# Patient Record
Sex: FEMALE
[De-identification: names, ages, dates, MRNs, and addresses within clinical notes are randomized per-mention and may not be internally consistent; named-entity substitution may affect disease eponyms.]

---

## 2019-02-13 PROBLEM — Z00.00 ENCOUNTER FOR PREVENTIVE HEALTH EXAMINATION: Status: ACTIVE | Noted: 2019-02-13

## 2019-02-20 ENCOUNTER — APPOINTMENT (OUTPATIENT)
Dept: OTOLARYNGOLOGY | Facility: CLINIC | Age: 44
End: 2019-02-20

## 2024-09-03 ENCOUNTER — NON-APPOINTMENT (OUTPATIENT)
Age: 49
End: 2024-09-03

## 2024-09-03 ENCOUNTER — APPOINTMENT (OUTPATIENT)
Dept: RADIATION ONCOLOGY | Facility: CLINIC | Age: 49
End: 2024-09-03
Payer: COMMERCIAL

## 2024-09-03 ENCOUNTER — APPOINTMENT (OUTPATIENT)
Dept: NEUROSURGERY | Facility: CLINIC | Age: 49
End: 2024-09-03
Payer: COMMERCIAL

## 2024-09-03 VITALS
RESPIRATION RATE: 18 BRPM | HEIGHT: 60 IN | HEART RATE: 67 BPM | OXYGEN SATURATION: 98 % | SYSTOLIC BLOOD PRESSURE: 132 MMHG | DIASTOLIC BLOOD PRESSURE: 85 MMHG | BODY MASS INDEX: 18.85 KG/M2 | WEIGHT: 96 LBS

## 2024-09-03 DIAGNOSIS — Z87.891 PERSONAL HISTORY OF NICOTINE DEPENDENCE: ICD-10-CM

## 2024-09-03 DIAGNOSIS — Z85.3 PERSONAL HISTORY OF MALIGNANT NEOPLASM OF BREAST: ICD-10-CM

## 2024-09-03 DIAGNOSIS — G93.89 OTHER SPECIFIED DISORDERS OF BRAIN: ICD-10-CM

## 2024-09-03 PROCEDURE — 99204 OFFICE O/P NEW MOD 45 MIN: CPT | Mod: 95

## 2024-09-03 PROCEDURE — 99204 OFFICE O/P NEW MOD 45 MIN: CPT

## 2024-09-03 RX ORDER — ACETAMINOPHEN 500 MG/1
TABLET ORAL
Refills: 0 | Status: ACTIVE | COMMUNITY

## 2024-09-03 RX ORDER — ZINC SULFATE 50(220)MG
CAPSULE ORAL
Refills: 0 | Status: ACTIVE | COMMUNITY

## 2024-09-03 RX ORDER — B-COMPLEX WITH VITAMIN C
TABLET ORAL
Refills: 0 | Status: ACTIVE | COMMUNITY

## 2024-09-03 RX ORDER — ERGOCALCIFEROL 1.25 MG/1
CAPSULE ORAL
Refills: 0 | Status: ACTIVE | COMMUNITY

## 2024-09-03 RX ORDER — TAMOXIFEN CITRATE 20 MG/1
TABLET, FILM COATED ORAL
Refills: 0 | Status: ACTIVE | COMMUNITY

## 2024-09-03 RX ORDER — OMEGA-3/DHA/EPA/FISH OIL 300-1000MG
CAPSULE ORAL
Refills: 0 | Status: ACTIVE | COMMUNITY

## 2024-09-03 NOTE — HISTORY OF PRESENT ILLNESS
[FreeTextEntry1] : Haley Rodriguez is a 50 y/o right- handed female, former smoker, with a PMHx of inflammatory arthritis, GERD, iron deficiency anemia, dx with infiltrating ductal carcinoma of right breast Oct 2022 s/p biopsy 10/7/22- PATH: invasive and in situ ductal carcinoma (ER positive, [place] positive, HER 2 3+ positive, Ki67 50%); s/p chemo Nov 2022- April 2023; s/p bilateral mastectomies 6/1/23 with bilateral breast reconstruction 9/5/23 who presents for evaluation of brain mass on recent MRI seen during workup for headache x 3 days that resolved on its own. She has had all her oncological care with [facility] and saw Dr. DiAmico today for second opinion. She is being seen for consideration of radiation therapy.  She plans to present to ED at [facility] today for expedited work up.   No radiation history No pacemaker  LMP 12/2022, states she is not pregnant.   Onc HX: - 9/6/22 presented to ER with right breast pass that was painful/growing - 9/22/22 diagnostic mammo and US of right breast concerning for mass.  - 10/7/22 s/p right breast core needle biopsy. PATH: invasive and in situ ductal carcinoma; (ER positive, [place] positive, HER 2 3+ positive, Ki67 50%) - 11/22/22- 1/24/23 completed neoadjuvant chemotherapy: first of Adriamycin 60 mg/m2 with cytoxan 600 mg/m2 (4 cycles) - 2/14/23- 4/18/23 s/p taxotere 75 mg/m2 with herceptin and perjeta - 6/1/23 s/p bilateral mastectomies - 7/12/23 restarted herceptin and perjeta with anastozole and lupron  - 9/5/23 s/p breast reconstruction  - 11/1/23: due to severe musculoskeletal pain and osteoporosis, anastazole was discontinued and tamoxifen started.  - 3/18/24 completed 1 year of herceptin and perjeta cycle #17  - She had headache x 3 days in about June that resolved on its own. Her PCP ordered her head CTH for eval that she completed 8/23/24 which was c/f interval development of cystic mass in left cerebellum compared to CT head 2/20/23. Was then referred for MRI brain which she completed 8/29/24 with report of a cystic mass in the left posterior fossa measuring 1.8 x 3 cm with associated rim enhancement and mild T2 hyperintense signal in the left cerebral hemisphere.   Remains on tamoxifen per onc Dr. Ureña @ [facility].   CARE TEAM:  *Onc: Jairo Ureña @ [facility]  [phone] [address] [place] [address]   *PCP: Marylou Carroll @ [facility]  [phone] [address] [place] [address]

## 2024-09-03 NOTE — PHYSICAL EXAM
[General Appearance - Alert] : alert [General Appearance - In No Acute Distress] : in no acute distress [General Appearance - Well-Appearing] : healthy appearing [Oriented To Time, Place, And Person] : oriented to person, place, and time [Impaired Insight] : insight and judgment were intact [Affect] : the affect was normal [Memory Recent] : recent memory was not impaired [Motor Tone] : muscle tone was normal in all four extremities [Motor Strength] : muscle strength was normal in all four extremities [Motor Handedness Right-Handed] : the patient is right hand dominant [Sensation Tactile Decrease] : light touch was intact [Sclera] : the sclera and conjunctiva were normal [Neck Appearance] : the appearance of the neck was normal [] : no respiratory distress [Respiration, Rhythm And Depth] : normal respiratory rhythm and effort [Abnormal Walk] : normal gait [Skin Color & Pigmentation] : normal skin color and pigmentation [FreeTextEntry5] : CN II-XII grossly intact

## 2024-09-03 NOTE — REASON FOR VISIT
[Consideration of Curative Therapy] : consideration of curative therapy for [Brain Metastasis] : brain metastasis [Home] : at home, [unfilled] , at the time of the visit. [Medical Office: ([facility])___] : at the medical office located in  [Patient] : the patient

## 2024-09-03 NOTE — REASON FOR VISIT
[Consideration of Curative Therapy] : consideration of curative therapy for [Brain Metastasis] : brain metastasis [Home] : at home, [unfilled] , at the time of the visit. [Medical Office: ([facility])___] : at the medical office located in  [Patient] : the patient

## 2024-09-03 NOTE — ASSESSMENT
[FreeTextEntry1] : 49-year-old female with a history of invasive ductal carcinoma of the right breast, diagnosed in October 2022, treated with neoadjuvant chemotherapy (adriamycin, cytoxan, taxotere, herceptin, and perjeta) from November 2022 to April 2023, followed by bilateral mastectomies with reconstruction in June 2023. She is currently on tamoxifen and trastuzumab maintenance therapy. She recently developed a headache and a new cystic mass in the left cerebellum, measuring approximately 3 cm x 2 cm, detected on MRI brain in August 2023, compared to a previous CT head in February 2020. The mass is concerning for metastatic disease. The patient is likely suffering from a metastatic brain tumor from her previously diagnosed and treated breast cancer. The current cyst in the brain, considering the timeline, is most probably associated with her pre-existing condition, and her type of breast cancer has been shown to have a higher prevalence of brain metastases.Surgery is recommended due to the patient's overall good condition, with a tentative date between September 16th to 20th. The patient is to be informed of the potential risk of leptomeningeal disease, where tumor cells can spread into surrounding areas, and the clinical trial we have to prevent it.   PLAN: - ER   Patient verbalizes understanding of today's discussion and next steps in treatment plan.     A total of 45 minutes was spent reviewing the labs, imaging and physical examination of the patient. We discussed the diagnosis, and the plan. The patient's questions were answered. The patient demonstrated an excellent understanding of the plan.

## 2024-09-03 NOTE — HISTORY OF PRESENT ILLNESS
[de-identified] : 48 y/o right- handed female, former smoker, with a PMHx of inflammatory arthritis, GERD, iron deficiency anemia, dx with infiltrating ductal carcinoma of right breast Oct 2022 s/p biopsy 10/7/22- PATH: invasive and in situ ductal carcinoma (ER positive, [place] positive, HER 2 3+ positive, Ki67 50%); s/p chemo Nov 2022- April 2023; s/p bilateral mastectomies 6/1/23 with bilateral breast reconstruction 9/5/23 who presents for evaluation of brain mass on recent MRI seen during workup for headache x 3 days that resolved on its own.   Onc HX: - 9/6/22 presented to ER with right breast pass that was painful/growing - 9/22/22 diagnostic mammo and US of right breast concerning for mass.  - 10/7/22 s/p right breast core needle biopsy. PATH: invasive and in situ ductal carcinoma; (ER positive, [place] positive, HER 2 3+ positive, Ki67 50%) - 11/22/22- 1/24/23 completed neoadjuvant chemotherapy: first of Adriamycin 60 mg/m2 with cytoxan 600 mg/m2 (4 cycles) - 2/14/23- 4/18/23 s/p taxotere 75 mg/m2 with herceptin and perjeta - 6/1/23 s/p bilateral mastectomies - 7/12/23 restarted herceptin and perjeta with anastozole and lupron  - 9/5/23 s/p breast reconstruction  - 11/1/23: due to severe musculoskeletal pain and osteoporosis, anastazole was discontinued and tamoxifen started.  - 3/18/24 completed 1 year of herceptin and perjeta cycle #17  - She had headache x 3 days in about June that resolved on its own. Her PCP ordered her head CTH for eval that she completed 8/23/24 which was c/f interval development of cystic mass in left cerebellum compared to CT head 2/20/23. Was then referred for MRI brain which she completed 8/29/24 with report of a cystic mass in the left posterior fossa measuring 1.8 x 3 cm with associated rim enhancement and mild T2 hyperintense signal in the left cerebral hemisphere.   Presents today for evaluation, second opinion. She saw neurosurgery Dr. Rahman this morning at [place] and was recommended surgical resection.  She denies ongoing headaches, dizziness, weakness, seizure like activities, other new/worsening focal neurological deficits. She endorses is very anxious given recent exam findings.  Remains on tamoxifen per onc Dr. Ureña @ [facility].     CARE TEAM:  *Onc: Jairo Ureña @ [place]  [phone] [address] [place] [address]   *PCP: Marylou Carroll @ [place]  [phone] [address] [place] [address]

## 2024-09-03 NOTE — REASON FOR VISIT
[Consideration of Curative Therapy] : consideration of curative therapy for [Brain Metastasis] : brain metastasis [Home] : at home, [unfilled] , at the time of the visit. [Medical Office: ([facility])___] : at the medical office located in  [Patient] : the patient

## 2024-09-03 NOTE — REVIEW OF SYSTEMS
[As Noted in HPI] : as noted in HPI [Fever] : no fever [Chills] : no chills [Confused or Disoriented] : no confusion [Facial Weakness] : no facial weakness [Arm Weakness] : no arm weakness [Hand Weakness] : no hand weakness [Leg Weakness] : no leg weakness [Numbness] : no numbness [Tingling] : no tingling [Seizures] : no convulsions [Dizziness] : no dizziness [Eyesight Problems] : no eyesight problems [Chest Pain] : no chest pain [Palpitations] : no palpitations [Shortness Of Breath] : no shortness of breath

## 2024-09-04 NOTE — HISTORY OF PRESENT ILLNESS
[FreeTextEntry1] : Haley Rodriguez is a 48 y/o right- handed female, former smoker, with a PMHx of inflammatory arthritis, GERD, iron deficiency anemia, dx with infiltrating ductal carcinoma of right breast Oct 2022 s/p biopsy 10/7/22- PATH: invasive and in situ ductal carcinoma (ER positive, [place] positive, HER 2 3+ positive, Ki67 50%); s/p chemo Nov 2022- April 2023; s/p bilateral mastectomies 6/1/23 with bilateral breast reconstruction 9/5/23 who presents for evaluation of brain mass on recent MRI seen during workup for headache x 3 days that resolved on its own. She has had all her oncological care with [facility] and saw Dr. DiAmico today for second opinion. She is being seen for consideration of radiation therapy.  She plans to present to ED at [facility] today for expedited work up.   No radiation history No pacemaker  LMP 12/2022, states she is not pregnant.   Onc HX: - 9/6/22 presented to ER with right breast pass that was painful/growing - 9/22/22 diagnostic mammo and US of right breast concerning for mass.  - 10/7/22 s/p right breast core needle biopsy. PATH: invasive and in situ ductal carcinoma; (ER positive, [place] positive, HER 2 3+ positive, Ki67 50%) - 11/22/22- 1/24/23 completed neoadjuvant chemotherapy: first of Adriamycin 60 mg/m2 with cytoxan 600 mg/m2 (4 cycles) - 2/14/23- 4/18/23 s/p taxotere 75 mg/m2 with herceptin and perjeta - 6/1/23 s/p bilateral mastectomies - 7/12/23 restarted herceptin and perjeta with anastozole and lupron  - 9/5/23 s/p breast reconstruction  - 11/1/23: due to severe musculoskeletal pain and osteoporosis, anastazole was discontinued and tamoxifen started.  - 3/18/24 completed 1 year of herceptin and perjeta cycle #17  - She had headache x 3 days in about June that resolved on its own. Her PCP ordered her head CTH for eval that she completed 8/23/24 which was c/f interval development of cystic mass in left cerebellum compared to CT head 2/20/23. Was then referred for MRI brain which she completed 8/29/24 with report of a cystic mass in the left posterior fossa measuring 1.8 x 3 cm with associated rim enhancement and mild T2 hyperintense signal in the left cerebral hemisphere.   Remains on tamoxifen per onc Dr. Ureña @ [facility].   CARE TEAM:  *Onc: Jairo Ureña @ [facility]  [phone] [address] [place] [address]   *PCP: Marylou Carroll @ [facility]  [phone] [address] [place] [address]

## 2024-09-04 NOTE — HISTORY OF PRESENT ILLNESS
[FreeTextEntry1] : Haley Rodriguez is a 50 y/o right- handed female, former smoker, with a PMHx of inflammatory arthritis, GERD, iron deficiency anemia, dx with infiltrating ductal carcinoma of right breast Oct 2022 s/p biopsy 10/7/22- PATH: invasive and in situ ductal carcinoma (ER positive, [place] positive, HER 2 3+ positive, Ki67 50%); s/p chemo Nov 2022- April 2023; s/p bilateral mastectomies 6/1/23 with bilateral breast reconstruction 9/5/23 who presents for evaluation of brain mass on recent MRI seen during workup for headache x 3 days that resolved on its own. She has had all her oncological care with [facility] and saw Dr. DiAmico today for second opinion. She is being seen for consideration of radiation therapy.  She plans to present to ED at [facility] today for expedited work up.   No radiation history No pacemaker  LMP 12/2022, states she is not pregnant.   Onc HX: - 9/6/22 presented to ER with right breast pass that was painful/growing - 9/22/22 diagnostic mammo and US of right breast concerning for mass.  - 10/7/22 s/p right breast core needle biopsy. PATH: invasive and in situ ductal carcinoma; (ER positive, [place] positive, HER 2 3+ positive, Ki67 50%) - 11/22/22- 1/24/23 completed neoadjuvant chemotherapy: first of Adriamycin 60 mg/m2 with cytoxan 600 mg/m2 (4 cycles) - 2/14/23- 4/18/23 s/p taxotere 75 mg/m2 with herceptin and perjeta - 6/1/23 s/p bilateral mastectomies - 7/12/23 restarted herceptin and perjeta with anastozole and lupron  - 9/5/23 s/p breast reconstruction  - 11/1/23: due to severe musculoskeletal pain and osteoporosis, anastazole was discontinued and tamoxifen started.  - 3/18/24 completed 1 year of herceptin and perjeta cycle #17  - She had headache x 3 days in about June that resolved on its own. Her PCP ordered her head CTH for eval that she completed 8/23/24 which was c/f interval development of cystic mass in left cerebellum compared to CT head 2/20/23. Was then referred for MRI brain which she completed 8/29/24 with report of a cystic mass in the left posterior fossa measuring 1.8 x 3 cm with associated rim enhancement and mild T2 hyperintense signal in the left cerebral hemisphere.   Remains on tamoxifen per onc Dr. Ureña @ [facility].   CARE TEAM:  *Onc: Jairo Ureña @ [place]  [phone] [address] [place] [address]   *PCP: Marylou Carroll @ [place]  [phone] [address] [place] [address]

## 2024-09-05 ENCOUNTER — TRANSCRIPTION ENCOUNTER (OUTPATIENT)
Age: 49
End: 2024-09-05

## 2024-09-08 ENCOUNTER — TRANSCRIPTION ENCOUNTER (OUTPATIENT)
Age: 49
End: 2024-09-08

## 2024-09-09 ENCOUNTER — APPOINTMENT (OUTPATIENT)
Dept: NEUROSURGERY | Facility: HOSPITAL | Age: 49
End: 2024-09-09

## 2024-09-09 ENCOUNTER — RESULT REVIEW (OUTPATIENT)
Age: 49
End: 2024-09-09

## 2024-09-10 ENCOUNTER — TRANSCRIPTION ENCOUNTER (OUTPATIENT)
Age: 49
End: 2024-09-10

## 2024-09-11 ENCOUNTER — TRANSCRIPTION ENCOUNTER (OUTPATIENT)
Age: 49
End: 2024-09-11

## 2024-09-12 ENCOUNTER — TRANSCRIPTION ENCOUNTER (OUTPATIENT)
Age: 49
End: 2024-09-12

## 2024-09-12 ENCOUNTER — NON-APPOINTMENT (OUTPATIENT)
Age: 49
End: 2024-09-12

## 2024-09-16 ENCOUNTER — NON-APPOINTMENT (OUTPATIENT)
Age: 49
End: 2024-09-16

## 2024-09-16 ENCOUNTER — APPOINTMENT (OUTPATIENT)
Dept: NEUROSURGERY | Facility: CLINIC | Age: 49
End: 2024-09-16
Payer: COMMERCIAL

## 2024-09-16 DIAGNOSIS — Z98.890 OTHER SPECIFIED POSTPROCEDURAL STATES: ICD-10-CM

## 2024-09-16 PROCEDURE — 99441: CPT | Mod: 93

## 2024-09-16 NOTE — REASON FOR VISIT
[Home] : at home, [unfilled] , at the time of the visit. [Medical Office: ([facility])___] : at the medical office located in  [Verbal consent obtained from patient] : the patient, [unfilled] [de-identified] : L suboccipital craniotomy for tumor resection [de-identified] : 9/9/24 [de-identified] : PATH: Metastatic carcinoma, consistent with mammary origin ER-, [place]-, HER-2 +

## 2024-09-16 NOTE — ASSESSMENT
[FreeTextEntry1] : 49-year-old female with a history of invasive ductal carcinoma of the right breast, diagnosed in October 2022, treated with neoadjuvant chemotherapy (adriamycin, cytoxan, taxotere, herceptin, and perjeta) from November 2022 to April 2023, followed by bilateral mastectomies with reconstruction in June 2023. She is currently on tamoxifen and trastuzumab maintenance therapy. She recently developed a headache and a new cystic mass in the left cerebellum, measuring approximately 3 cm x 2 cm, detected on MRI brain in August 2023, compared to a previous CT head in February 2020. The mass is concerning for metastatic disease. The patient is likely suffering from a metastatic brain tumor from her previously diagnosed and treated breast cancer. The current cyst in the brain, considering the timeline, is most probably associated with her pre-existing condition, and her type of breast cancer has been shown to have a higher prevalence of brain metastases. She is s/p L suboccipital craniotomy for tumor resection on 9/9/24. Path c/w metastatic carcinoma, consistent with mammary origin ER-, [place]-, HER-2+  PLAN: -return to clinic with Dr. D'amico in 1 week  -repeat MRI (1 month from SRS) due early October   Patient verbalizes understanding of today's discussion and next steps in treatment plan.  A total of 10 minutes was spent reviewing the patient's history, any available imaging, and verbal examination of the patient. The patient's questions were answered. The patient demonstrated an excellent understanding of todays discussion and next steps in treatment plan.

## 2024-09-16 NOTE — HISTORY OF PRESENT ILLNESS
[de-identified] : 48 y/o right- handed female, former smoker, with a PMHx of inflammatory arthritis, GERD, iron deficiency anemia, dx with infiltrating ductal carcinoma of right breast Oct 2022 s/p biopsy 10/7/22- PATH: invasive and in situ ductal carcinoma (ER positive, [place] positive, HER 2 3+ positive, Ki67 50%); s/p chemo Nov 2022- April 2023; s/p bilateral mastectomies 6/1/23 with bilateral breast reconstruction 9/5/23 who presents for evaluation of brain mass on recent MRI seen during workup for headache x 3 days that resolved on its own.   Onc HX: - 9/6/22 presented to ER with right breast pass that was painful/growing - 9/22/22 diagnostic mammo and US of right breast concerning for mass.  - 10/7/22 s/p right breast core needle biopsy. PATH: invasive and in situ ductal carcinoma; (ER positive, [place] positive, HER 2 3+ positive, Ki67 50%) - 11/22/22- 1/24/23 completed neoadjuvant chemotherapy: first of Adriamycin 60 mg/m2 with cytoxan 600 mg/m2 (4 cycles) - 2/14/23- 4/18/23 s/p taxotere 75 mg/m2 with herceptin and perjeta - 6/1/23 s/p bilateral mastectomies - 7/12/23 restarted herceptin and perjeta with anastozole and lupron  - 9/5/23 s/p breast reconstruction  - 11/1/23: due to severe musculoskeletal pain and osteoporosis, anastazole was discontinued and tamoxifen started.  - 3/18/24 completed 1 year of herceptin and perjeta cycle #17  - She had headache x 3 days in about June that resolved on its own. Her PCP ordered her head CTH for eval that she completed 8/23/24 which was c/f interval development of cystic mass in left cerebellum compared to CT head 2/20/23. Was then referred for MRI brain which she completed 8/29/24 with report of a cystic mass in the left posterior fossa measuring 1.8 x 3 cm with associated rim enhancement and mild T2 hyperintense signal in the left cerebral hemisphere.   Presents today for evaluation, second opinion. She saw neurosurgery Dr. Rahman this morning at [place] and was recommended surgical resection.  She denies ongoing headaches, dizziness, weakness, seizure like activities, other new/worsening focal neurological deficits. She endorses is very anxious given recent exam findings.  Remains on tamoxifen per onc Dr. Ureña @ [facility].   She is s/p L suboccipital craniotomy for tumor resection on 9/9/24. Path c/w metastatic carcinoma, consistent with mammary origin ER-, [place]-, HER-2+  9/16/24: Presents today for routine post op phone call. Feeling well overall, denies fevers, chills, drainage, headaches. Washing hair daily. Continues to hold tamoxifen.    CARE TEAM:  *Onc: Jairo Ureña @ [place]  [phone] [address] [place] [address]   *PCP: Marylou Carroll @ [place]  [phone] [address] [place] [address]

## 2024-09-17 ENCOUNTER — APPOINTMENT (OUTPATIENT)
Dept: RADIATION ONCOLOGY | Facility: CLINIC | Age: 49
End: 2024-09-17

## 2024-09-17 PROCEDURE — 99442: CPT | Mod: 93

## 2024-09-17 NOTE — REASON FOR VISIT
[Routine Follow-Up] : routine follow-up visit for [Brain Metastasis] : brain metastasis [Home] : at home, [unfilled] , at the time of the visit. [Medical Office: ([facility])___] : at the medical office located in  [Patient] : the patient

## 2024-09-17 NOTE — REASON FOR VISIT
[Routine Follow-Up] : routine follow-up visit for [Brain Metastasis] : brain metastasis [Home] : at home, [unfilled] , at the time of the visit. [Medical Office: ([facility])___] : at the medical office located in  [Patient] : the patient

## 2024-09-22 ENCOUNTER — NON-APPOINTMENT (OUTPATIENT)
Age: 49
End: 2024-09-22

## 2024-09-23 ENCOUNTER — APPOINTMENT (OUTPATIENT)
Dept: HEMATOLOGY ONCOLOGY | Facility: CLINIC | Age: 49
End: 2024-09-23
Payer: COMMERCIAL

## 2024-09-23 VITALS
HEIGHT: 59 IN | WEIGHT: 101.5 LBS | RESPIRATION RATE: 18 BRPM | SYSTOLIC BLOOD PRESSURE: 129 MMHG | BODY MASS INDEX: 20.46 KG/M2 | OXYGEN SATURATION: 100 % | DIASTOLIC BLOOD PRESSURE: 82 MMHG | TEMPERATURE: 98.2 F | HEART RATE: 76 BPM

## 2024-09-23 LAB
ALBUMIN SERPL ELPH-MCNC: 3.5 G/DL
ALP BLD-CCNC: 72 U/L
ALT SERPL-CCNC: 50 U/L
ANION GAP SERPL CALC-SCNC: -4 MMOL/L
AST SERPL-CCNC: 36 U/L
BILIRUB SERPL-MCNC: 0.5 MG/DL
BUN SERPL-MCNC: 21 MG/DL
CALCIUM SERPL-MCNC: 9.8 MG/DL
CHLORIDE SERPL-SCNC: 111 MMOL/L
CO2 SERPL-SCNC: 31 MMOL/L
CREAT SERPL-MCNC: 0.5 MG/DL
EGFR: 116 ML/MIN/1.73M2
GLUCOSE SERPL-MCNC: 97 MG/DL
HBV CORE IGG+IGM SER QL: NONREACTIVE
HBV SURFACE AB SER QL: NONREACTIVE
HBV SURFACE AG SER QL: NONREACTIVE
HCT VFR BLD CALC: 38 %
HGB BLD-MCNC: 12.4 G/DL
LYMPHOCYTES # BLD AUTO: 2.1 K/UL
LYMPHOCYTES NFR BLD AUTO: 26.7 %
MAN DIFF?: NO
MCHC RBC-ENTMCNC: 29 PG
MCHC RBC-ENTMCNC: 32.6 GM/DL
MCV RBC AUTO: 89 FL
NEUTROPHILS # BLD AUTO: 5.4 K/UL
NEUTROPHILS NFR BLD AUTO: 66.6 %
PLATELET # BLD AUTO: 274 K/UL
POTASSIUM SERPL-SCNC: 5.1 MMOL/L
PROT SERPL-MCNC: 6.9 G/DL
RBC # BLD: 4.27 M/UL
RBC # FLD: 14.7 %
SODIUM SERPL-SCNC: 138 MMOL/L
WBC # FLD AUTO: 8 K/UL

## 2024-09-23 PROCEDURE — 99205 OFFICE O/P NEW HI 60 MIN: CPT

## 2024-09-23 PROCEDURE — G2211 COMPLEX E/M VISIT ADD ON: CPT | Mod: NC

## 2024-09-23 NOTE — HISTORY OF PRESENT ILLNESS
[de-identified] : 49 year old female with a history of Stage IIB T3N1M0 right IDC s/p neoadjuvant AC-THP (11/22/22-4/18/2023), 6/1/23 b/l mastectomy/SLNB, 9/5/23 breast reconstruction, adjuvant HP (7/13/23-3/18/24), and anastrozole/Lupron (7/12/23-10/2023), tamoxifen (11/1/23-8/2023) is referred by Dr. D'Amico to discuss treatment for metastatic breast cancer.  A brain mass was found on CT work up for headaches and she subsequently underwent SRS on 9/6/24 and a L suboccipital craniotomy and tumor cerebellar resection on 9/9/24 which was positive for breast metastasis. Plan is for 3-5 days of RT.  She has been under the care of Dr. Jairo Ureña @ [facility].  9/22/22 MG/US: 4.7 x 1.8 x 4.1 cm ill-defined angulated righ breast mass. Abnormal axillary LN.  10/7/22 Right breast mass biopsy: poorly differentiated invasive carcinoma. ER 50%, UT 50%, HER2 3+, Ki-67 50%  10/21/22 bone scan: no definite evidence of osseous metastatic disease.  10/26/22 CT CAP: In RUOQ breast there is an irregular density measuring 2.7 x 2.5 cm.  Several axillary nodes seen, 1 prominent 1.1 cm in short axis.  6/1/23 b/l mastectomy: Right - invasive ductal carcinoma 6 mm, DCIS. 0/2 LNs  5/13/24 bone scan: no evidence of osseous metastatic disease.  8/23/24 CT head/neck: Interval development of a 2.8 cm cystic appearing lesion associated with the left cerebellum with possible wall enhancement when correlating with the concurrent soft tissue neck CT.   8/29/24 MR head: Cystic mass in the left posterior fossa measuring 1.8 x 3.0 cm with associated rim enhancement and mild T2 hyperintense signal in the left cerebral hemisphere.  Findings are nonspecific but in the setting of known malignancy, a solitary metastasis should be considered.  8/29/24 MR CS: multilevel degenerative changes with spinal canal and neuroforaminal stenosis.  9/4/24 MR head: 1. Intra-axial cystic peripherally enhancing lesion in the left side of the cerebellum, likely related to metastasis in this patient with a history of breast cancer. Minimal edema and no significant mass effect. 2 . Additional focus of enhancement in the right superior cerebellar hemisphere (image 27 series 802) may represent a vascular structure or small metastasis. Recommend attention on follow-up.  9/4/24 CT CAP: No evidence of metastatic disease within the chest, abdomen, or pelvis. Nonspecific 7 mm peripheral airspace opacity within the right upper lobe. Correlation with prior outside imaging, if available is recommended.  9/6/24 Stereotactic radiosurgery to complex cerebellar lesion.  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 ER <1%, UT <1%, HER2 3+  9/10/24 MR brain: Status post left suboccipital craniotomy for resection of tumor. Surgical cavity within the left cerebellum contains fluid, blood products, and air. Peripheral enhancement at the posterior aspect of the surgical cavity and adjacent dural thickening, likely postsurgical.  [de-identified] : Pt c/o burning of the scalp after SRS.  She reports 20% hearing loss.

## 2024-09-23 NOTE — HISTORY OF PRESENT ILLNESS
[de-identified] : 49 year old female with a history of Stage IIB T3N1M0 right IDC s/p neoadjuvant AC-THP (11/22/22-4/18/2023), 6/1/23 b/l mastectomy/SLNB, 9/5/23 breast reconstruction, adjuvant HP (7/13/23-3/18/24), and anastrozole/Lupron (7/12/23-10/2023), tamoxifen (11/1/23-8/2023) is referred by Dr. D'Amico to discuss treatment for metastatic breast cancer.  A brain mass was found on CT work up for headaches and she subsequently underwent SRS on 9/6/24 and a L suboccipital craniotomy and tumor cerebellar resection on 9/9/24 which was positive for breast metastasis. Plan is for 3-5 days of RT.  She has been under the care of Dr. Jiaro Ureña @ [facility].  9/22/22 MG/US: 4.7 x 1.8 x 4.1 cm ill-defined angulated righ breast mass. Abnormal axillary LN.  10/7/22 Right breast mass biopsy: poorly differentiated invasive carcinoma. ER 50%, VT 50%, HER2 3+, Ki-67 50%  10/21/22 bone scan: no definite evidence of osseous metastatic disease.  10/26/22 CT CAP: In RUOQ breast there is an irregular density measuring 2.7 x 2.5 cm.  Several axillary nodes seen, 1 prominent 1.1 cm in short axis.  6/1/23 b/l mastectomy: Right - invasive ductal carcinoma 6 mm, DCIS. 0/2 LNs  5/13/24 bone scan: no evidence of osseous metastatic disease.  8/23/24 CT head/neck: Interval development of a 2.8 cm cystic appearing lesion associated with the left cerebellum with possible wall enhancement when correlating with the concurrent soft tissue neck CT.   8/29/24 MR head: Cystic mass in the left posterior fossa measuring 1.8 x 3.0 cm with associated rim enhancement and mild T2 hyperintense signal in the left cerebral hemisphere.  Findings are nonspecific but in the setting of known malignancy, a solitary metastasis should be considered.  8/29/24 MR CS: multilevel degenerative changes with spinal canal and neuroforaminal stenosis.  9/4/24 MR head: 1. Intra-axial cystic peripherally enhancing lesion in the left side of the cerebellum, likely related to metastasis in this patient with a history of breast cancer. Minimal edema and no significant mass effect. 2 . Additional focus of enhancement in the right superior cerebellar hemisphere (image 27 series 802) may represent a vascular structure or small metastasis. Recommend attention on follow-up.  9/4/24 CT CAP: No evidence of metastatic disease within the chest, abdomen, or pelvis. Nonspecific 7 mm peripheral airspace opacity within the right upper lobe. Correlation with prior outside imaging, if available is recommended.  9/6/24 Stereotactic radiosurgery to complex cerebellar lesion.  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 ER <1%, VT <1%, HER2 3+  9/10/24 MR brain: Status post left suboccipital craniotomy for resection of tumor. Surgical cavity within the left cerebellum contains fluid, blood products, and air. Peripheral enhancement at the posterior aspect of the surgical cavity and adjacent dural thickening, likely postsurgical.  [de-identified] : Pt c/o burning of the scalp after SRS.  She reports 20% hearing loss.

## 2024-09-24 ENCOUNTER — RESULT REVIEW (OUTPATIENT)
Age: 49
End: 2024-09-24

## 2024-09-24 NOTE — HISTORY OF PRESENT ILLNESS
[FreeTextEntry1] : Haley Rodriguez is a 50 y/o right- handed female, former smoker, with a PMHx of inflammatory arthritis, GERD, iron deficiency anemia, dx with infiltrating ductal carcinoma of right breast Oct 2022 s/p biopsy 10/7/22- PATH: invasive and in situ ductal carcinoma (ER positive, RI positive, HER 2 3+ positive, Ki67 50%); s/p chemo Nov 2022- April 2023; s/p bilateral mastectomies 6/1/23 with bilateral breast reconstruction 9/5/23 who presents for evaluation of brain mass on recent MRI seen during workup for headache x 3 days that resolved on its own. She has had all her oncological care with [facility] and saw Dr. DiAmico for second opinion. She is being seen for consideration of radiation therapy.    No radiation history No pacemaker  LMP 12/2022, states she is not pregnant.   Onc HX: - 9/6/22 presented to ER with right breast pass that was painful/growing - 9/22/22 diagnostic mammo and US of right breast concerning for mass.  - 10/7/22 s/p right breast core needle biopsy. PATH: invasive and in situ ductal carcinoma; (ER positive, RI positive, HER 2 3+ positive, Ki67 50%) - 11/22/22- 1/24/23 completed neoadjuvant chemotherapy: first of Adriamycin 60 mg/m2 with cytoxan 600 mg/m2 (4 cycles) - 2/14/23- 4/18/23 s/p taxotere 75 mg/m2 with herceptin and perjeta - 6/1/23 s/p bilateral mastectomies - 7/12/23 restarted herceptin and perjeta with anastozole and lupron  - 9/5/23 s/p breast reconstruction  - 11/1/23: due to severe musculoskeletal pain and osteoporosis, anastazole was discontinued and tamoxifen started.  - 3/18/24 completed 1 year of herceptin and perjeta cycle #17  - She had headache x 3 days in about June that resolved on its own. Her PCP ordered her head CTH for eval that she completed 8/23/24 which was c/f interval development of cystic mass in left cerebellum compared to CT head 2/20/23. Was then referred for MRI brain which she completed 8/29/24 with report of a cystic mass in the left posterior fossa measuring 1.8 x 3 cm with associated rim enhancement and mild T2 hyperintense signal in the left cerebral hemisphere.   Remains on tamoxifen and trastuzumab per onc Dr. Ureña @ [facility].   9/17/24 - Follow Up - Patient presents for follow up s/p initial radiation consultation 9/3/2024. She is now s/p L suboccipital craniotomy for tumor resection on 9/9/24. Path c/w metastatic carcinoma, consistent with mammary origin ER-, RI-, HER-2+. He is seeing Dr. Arellano 9/23 and following up with Dr. DiAmico 9/25. Today, patient states she is feeling very good. denies headaches, dizziness, vision changes, hearing changes, balance issues, fatigue, sleep disturbance, nausea/vomiting. she notes she chipped a tooth of the right lower jaw a couple days ago, denies pain.   9/10/24 - MRI Brain (Post Op) FINDINGS: Status post left suboccipital craniotomy for resection of tumor. 3 x 2 cm surgical cavity within the left cerebellum contains fluid, blood products, and air. There is peripheral enhancement at the posterior aspect of the surgical cavity and adjacent dural thickening, likely postsurgical. There is stable mild surrounding vasogenic edema. Stable 4 mm focus of enhancement in the right superior cerebellar hemisphere. The ventricles, cisternal spaces, and cortical sulci are normal in size and configuration. There is no significant mass effect or midline shift. No extra-axial collection. The diffusion-weighted images demonstrate no recent infarction. There are preserved large vascular flow-voids. The postcontrast images demonstrate no other abnormal intracranial enhancement. Mild mucosal thickening bilateral ethmoid air cells. Left mastoid effusion. The right mastoid air cells are well-aerated.  IMPRESSION: Status post left suboccipital craniotomy for resection of tumor. Surgical cavity within the left cerebellum contains fluid, blood products, and air. Peripheral enhancement at the posterior aspect of the surgical cavity and adjacent dural thickening, likely postsurgical.  9/4/2024 - MRI Brain (pre-op) FINDINGS: Intra-axial cystic lesion in the posterior fossa measuring 2.8 x 2 cm with a small amount of surrounding edema. This lesion demonstrates thin minimally irregular peripheral enhancement. No acute ischemia. No intracranial hemorrhage. The ventricles are normal without evidence of hydrocephalus. There are no extra-axial fluid collections. The skull base flow voids are present. The visualized intraorbital contents are normal. The imaged portions of the paranasal sinuses are clear. The mastoid air cells are clear. The visualized soft tissues and osseous structures appear normal.  IMPRESSION: 1. Intra-axial cystic peripherally enhancing lesion in the left side of the cerebellum, likely related to metastasis in this patient with a history of breast cancer. Minimal edema and no significant mass effect. 2 . Additional focus of enhancement in the right superior cerebellar hemisphere (image 27 series 802) may represent a vascular structure or small metastasis. Recommend attention on follow-up.  CARE TEAM:  *Onc: Jairo Ureña @ [facility]  [phone] [address] [place] [address]   *PCP: Marlyou Carroll @ [facility]  [phone] [address] [place] [id number]

## 2024-09-24 NOTE — HISTORY OF PRESENT ILLNESS
[FreeTextEntry1] : Haley Rodriguez is a 48 y/o right- handed female, former smoker, with a PMHx of inflammatory arthritis, GERD, iron deficiency anemia, dx with infiltrating ductal carcinoma of right breast Oct 2022 s/p biopsy 10/7/22- PATH: invasive and in situ ductal carcinoma (ER positive, TN positive, HER 2 3+ positive, Ki67 50%); s/p chemo Nov 2022- April 2023; s/p bilateral mastectomies 6/1/23 with bilateral breast reconstruction 9/5/23 who presents for evaluation of brain mass on recent MRI seen during workup for headache x 3 days that resolved on its own. She has had all her oncological care with [facility] and saw Dr. DiAmico for second opinion. She is being seen for consideration of radiation therapy.    No radiation history No pacemaker  LMP 12/2022, states she is not pregnant.   Onc HX: - 9/6/22 presented to ER with right breast pass that was painful/growing - 9/22/22 diagnostic mammo and US of right breast concerning for mass.  - 10/7/22 s/p right breast core needle biopsy. PATH: invasive and in situ ductal carcinoma; (ER positive, TN positive, HER 2 3+ positive, Ki67 50%) - 11/22/22- 1/24/23 completed neoadjuvant chemotherapy: first of Adriamycin 60 mg/m2 with cytoxan 600 mg/m2 (4 cycles) - 2/14/23- 4/18/23 s/p taxotere 75 mg/m2 with herceptin and perjeta - 6/1/23 s/p bilateral mastectomies - 7/12/23 restarted herceptin and perjeta with anastozole and lupron  - 9/5/23 s/p breast reconstruction  - 11/1/23: due to severe musculoskeletal pain and osteoporosis, anastazole was discontinued and tamoxifen started.  - 3/18/24 completed 1 year of herceptin and perjeta cycle #17  - She had headache x 3 days in about June that resolved on its own. Her PCP ordered her head CTH for eval that she completed 8/23/24 which was c/f interval development of cystic mass in left cerebellum compared to CT head 2/20/23. Was then referred for MRI brain which she completed 8/29/24 with report of a cystic mass in the left posterior fossa measuring 1.8 x 3 cm with associated rim enhancement and mild T2 hyperintense signal in the left cerebral hemisphere.   Remains on tamoxifen and trastuzumab per onc Dr. Ureña @ [facility].   9/17/24 - Follow Up - Patient presents for follow up s/p initial radiation consultation 9/3/2024. She is now s/p L suboccipital craniotomy for tumor resection on 9/9/24. Path c/w metastatic carcinoma, consistent with mammary origin ER-, TN-, HER-2+. He is seeing Dr. Arellano 9/23 and following up with Dr. DiAmico 9/25. Today, patient states she is feeling very good. denies headaches, dizziness, vision changes, hearing changes, balance issues, fatigue, sleep disturbance, nausea/vomiting. she notes she chipped a tooth of the right lower jaw a couple days ago, denies pain.   9/10/24 - MRI Brain (Post Op) FINDINGS: Status post left suboccipital craniotomy for resection of tumor. 3 x 2 cm surgical cavity within the left cerebellum contains fluid, blood products, and air. There is peripheral enhancement at the posterior aspect of the surgical cavity and adjacent dural thickening, likely postsurgical. There is stable mild surrounding vasogenic edema. Stable 4 mm focus of enhancement in the right superior cerebellar hemisphere. The ventricles, cisternal spaces, and cortical sulci are normal in size and configuration. There is no significant mass effect or midline shift. No extra-axial collection. The diffusion-weighted images demonstrate no recent infarction. There are preserved large vascular flow-voids. The postcontrast images demonstrate no other abnormal intracranial enhancement. Mild mucosal thickening bilateral ethmoid air cells. Left mastoid effusion. The right mastoid air cells are well-aerated.  IMPRESSION: Status post left suboccipital craniotomy for resection of tumor. Surgical cavity within the left cerebellum contains fluid, blood products, and air. Peripheral enhancement at the posterior aspect of the surgical cavity and adjacent dural thickening, likely postsurgical.  9/4/2024 - MRI Brain (pre-op) FINDINGS: Intra-axial cystic lesion in the posterior fossa measuring 2.8 x 2 cm with a small amount of surrounding edema. This lesion demonstrates thin minimally irregular peripheral enhancement. No acute ischemia. No intracranial hemorrhage. The ventricles are normal without evidence of hydrocephalus. There are no extra-axial fluid collections. The skull base flow voids are present. The visualized intraorbital contents are normal. The imaged portions of the paranasal sinuses are clear. The mastoid air cells are clear. The visualized soft tissues and osseous structures appear normal.  IMPRESSION: 1. Intra-axial cystic peripherally enhancing lesion in the left side of the cerebellum, likely related to metastasis in this patient with a history of breast cancer. Minimal edema and no significant mass effect. 2 . Additional focus of enhancement in the right superior cerebellar hemisphere (image 27 series 802) may represent a vascular structure or small metastasis. Recommend attention on follow-up.  CARE TEAM:  *Onc: Jairo Ureña @ [facility]  [phone] [address] [place] [address]   *PCP: Marylou Carroll @ [facility]  [phone] [address] [place] [id number]

## 2024-09-25 ENCOUNTER — APPOINTMENT (OUTPATIENT)
Dept: NEUROSURGERY | Facility: CLINIC | Age: 49
End: 2024-09-25
Payer: COMMERCIAL

## 2024-09-25 VITALS
HEIGHT: 59 IN | SYSTOLIC BLOOD PRESSURE: 132 MMHG | BODY MASS INDEX: 20.36 KG/M2 | WEIGHT: 101 LBS | TEMPERATURE: 97.9 F | DIASTOLIC BLOOD PRESSURE: 89 MMHG | HEART RATE: 92 BPM | RESPIRATION RATE: 18 BRPM | OXYGEN SATURATION: 98 %

## 2024-09-25 PROCEDURE — 99024 POSTOP FOLLOW-UP VISIT: CPT

## 2024-09-25 NOTE — ASSESSMENT
[FreeTextEntry1] : 49 female with a history of breast cancer diagnosed in 2022. The initial tumor was ER positive, IL positive, and HER2 positive (triple positive). She underwent chemotherapy and mastectomies. In June, she presented with headaches, and subsequent workup revealed brain metastases. She underwent preoperative SRS as part of a clinical trial and subsequent left suboccipital craniotomy and resection of tumor. Awaiting follow up MRI and repeat SRS to small right cerebellar lesion. Has established care with Dr. Arellano.  Interestingly, The brain metastases were ER negative, IL negative, and HER2 positive, indicating a change in receptor status from the primary tumor.  Dr. D'Amico independently reviewed all available images with patient.  PLAN: -continue f/u with Dr. Arellano (onc) -continue f/u with Dr. Wernicke (rad onc) -MRI 1 month from SRS on 10/1/24 -phone call after MRI to review -f/u with NP in 1 month post MRI  -f/u with Dr. D'amico in 2 months    Patient verbalizes understanding of today's discussion and next steps in treatment plan.  Today, my ACP, Alissa Wong, was here to observe my evaluation and management services for this new problem/exacerbated condition to be followed going forward.   A total of 15 minutes was spent reviewing the labs, imaging and physical examination of the patient. We discussed the diagnosis, and the plan. The patient's questions were answered. The patient demonstrated an excellent understanding of the plan.

## 2024-09-25 NOTE — ASSESSMENT
[FreeTextEntry1] : 49 female with a history of breast cancer diagnosed in 2022. The initial tumor was ER positive, AZ positive, and HER2 positive (triple positive). She underwent chemotherapy and mastectomies. In June, she presented with headaches, and subsequent workup revealed brain metastases. She underwent preoperative SRS as part of a clinical trial and subsequent left suboccipital craniotomy and resection of tumor. Awaiting follow up MRI and repeat SRS to small right cerebellar lesion. Has established care with Dr. Arellano.  Interestingly, The brain metastases were ER negative, AZ negative, and HER2 positive, indicating a change in receptor status from the primary tumor.  Dr. D'Amico independently reviewed all available images with patient.  PLAN: -continue f/u with Dr. Arellano (onc) -continue f/u with Dr. Wernicke (rad onc) -MRI 1 month from SRS on 10/1/24 -phone call after MRI to review -f/u with NP in 1 month post MRI  -f/u with Dr. D'amico in 2 months    Patient verbalizes understanding of today's discussion and next steps in treatment plan.  Today, my ACP, Alissa Wong, was here to observe my evaluation and management services for this new problem/exacerbated condition to be followed going forward.   A total of 15 minutes was spent reviewing the labs, imaging and physical examination of the patient. We discussed the diagnosis, and the plan. The patient's questions were answered. The patient demonstrated an excellent understanding of the plan.

## 2024-09-25 NOTE — ASSESSMENT
[FreeTextEntry1] : 49 female with a history of breast cancer diagnosed in 2022. The initial tumor was ER positive, GA positive, and HER2 positive (triple positive). She underwent chemotherapy and mastectomies. In June, she presented with headaches, and subsequent workup revealed brain metastases. She underwent preoperative SRS as part of a clinical trial and subsequent left suboccipital craniotomy and resection of tumor. Awaiting follow up MRI and repeat SRS to small right cerebellar lesion. Has established care with Dr. Arellano.  Interestingly, The brain metastases were ER negative, GA negative, and HER2 positive, indicating a change in receptor status from the primary tumor.  Dr. D'Amico independently reviewed all available images with patient.  PLAN: -continue f/u with Dr. Arellano (onc) -continue f/u with Dr. Wernicke (rad onc) -MRI 1 month from SRS on 10/1/24 -phone call after MRI to review -f/u with NP in 1 month post MRI  -f/u with Dr. D'amico in 2 months    Patient verbalizes understanding of today's discussion and next steps in treatment plan.  Today, my ACP, Alissa Wong, was here to observe my evaluation and management services for this new problem/exacerbated condition to be followed going forward.   A total of 15 minutes was spent reviewing the labs, imaging and physical examination of the patient. We discussed the diagnosis, and the plan. The patient's questions were answered. The patient demonstrated an excellent understanding of the plan.

## 2024-09-25 NOTE — HISTORY OF PRESENT ILLNESS
[de-identified] : 50 y/o right- handed female, former smoker, with a PMHx of inflammatory arthritis, GERD, iron deficiency anemia, dx with infiltrating ductal carcinoma of right breast Oct 2022 s/p biopsy 10/7/22- PATH: invasive and in situ ductal carcinoma (ER positive, ND positive, HER 2 3+ positive, Ki67 50%); s/p chemo Nov 2022- April 2023; s/p bilateral mastectomies 6/1/23 with bilateral breast reconstruction 9/5/23 who presents for evaluation of brain mass on recent MRI seen during workup for headache x 3 days that resolved on its own.   Onc HX: - 9/6/22 presented to ER with right breast pass that was painful/growing - 9/22/22 diagnostic mammo and US of right breast concerning for mass.  - 10/7/22 s/p right breast core needle biopsy. PATH: invasive and in situ ductal carcinoma; (ER positive, ND positive, HER 2 3+ positive, Ki67 50%) - 11/22/22- 1/24/23 completed neoadjuvant chemotherapy: first of Adriamycin 60 mg/m2 with cytoxan 600 mg/m2 (4 cycles) - 2/14/23- 4/18/23 s/p taxotere 75 mg/m2 with herceptin and perjeta - 6/1/23 s/p bilateral mastectomies - 7/12/23 restarted herceptin and perjeta with anastozole and lupron  - 9/5/23 s/p breast reconstruction  - 11/1/23: due to severe musculoskeletal pain and osteoporosis, anastazole was discontinued and tamoxifen started.  - 3/18/24 completed 1 year of herceptin and perjeta cycle #17  - She had headache x 3 days in about June that resolved on its own. Her PCP ordered her head CTH for eval that she completed 8/23/24 which was c/f interval development of cystic mass in left cerebellum compared to CT head 2/20/23. Was then referred for MRI brain which she completed 8/29/24 with report of a cystic mass in the left posterior fossa measuring 1.8 x 3 cm with associated rim enhancement and mild T2 hyperintense signal in the left cerebral hemisphere.   Presents today for evaluation, second opinion. She saw neurosurgery Dr. Rahman this morning at [facility] and was recommended surgical resection.  She denies ongoing headaches, dizziness, weakness, seizure like activities, other new/worsening focal neurological deficits. She endorses is very anxious given recent exam findings.  Remains on tamoxifen per onc Dr. Ureña @ [facility].   She is s/p pre-op SRS and L suboccipital craniotomy for tumor resection on 9/9/24. Path c/w metastatic carcinoma, consistent with mammary origin ER-, ND-, HER-2+  9/16/24: Presents today for routine post op phone call. Feeling well overall, denies fevers, chills, drainage, headaches. Washing hair daily. Continues to hold tamoxifen.   Saw Dr. Arellano on 9/23/24 with plan: Given locoregional treatment of brain metastasis (resection/SRS) and no evidence of disease in the body, recommendation is to continue endocrine therapy with tamoxifen (after completion of RT) and to restart HER2-directed systemic therapy with combination with trastuzumab and pertuzumab every 3 weeks to start on 10/7/24.   Returns TODAY for routine post op follow up. Reports headaches, takes tylenol with relief. Also reports 1 week of mild blurry vision. Has 1 month post SRS MRI scheduled for 10/1/24.   CARE TEAM:  *Onc: Jairo Ureña @ [facility]  [phone] [address] [place] [address]   *PCP: Marylou Carroll @ [facility]  [phone] [address] [place] [address]

## 2024-09-25 NOTE — HISTORY OF PRESENT ILLNESS
[de-identified] : 48 y/o right- handed female, former smoker, with a PMHx of inflammatory arthritis, GERD, iron deficiency anemia, dx with infiltrating ductal carcinoma of right breast Oct 2022 s/p biopsy 10/7/22- PATH: invasive and in situ ductal carcinoma (ER positive, WI positive, HER 2 3+ positive, Ki67 50%); s/p chemo Nov 2022- April 2023; s/p bilateral mastectomies 6/1/23 with bilateral breast reconstruction 9/5/23 who presents for evaluation of brain mass on recent MRI seen during workup for headache x 3 days that resolved on its own.   Onc HX: - 9/6/22 presented to ER with right breast pass that was painful/growing - 9/22/22 diagnostic mammo and US of right breast concerning for mass.  - 10/7/22 s/p right breast core needle biopsy. PATH: invasive and in situ ductal carcinoma; (ER positive, WI positive, HER 2 3+ positive, Ki67 50%) - 11/22/22- 1/24/23 completed neoadjuvant chemotherapy: first of Adriamycin 60 mg/m2 with cytoxan 600 mg/m2 (4 cycles) - 2/14/23- 4/18/23 s/p taxotere 75 mg/m2 with herceptin and perjeta - 6/1/23 s/p bilateral mastectomies - 7/12/23 restarted herceptin and perjeta with anastozole and lupron  - 9/5/23 s/p breast reconstruction  - 11/1/23: due to severe musculoskeletal pain and osteoporosis, anastazole was discontinued and tamoxifen started.  - 3/18/24 completed 1 year of herceptin and perjeta cycle #17  - She had headache x 3 days in about June that resolved on its own. Her PCP ordered her head CTH for eval that she completed 8/23/24 which was c/f interval development of cystic mass in left cerebellum compared to CT head 2/20/23. Was then referred for MRI brain which she completed 8/29/24 with report of a cystic mass in the left posterior fossa measuring 1.8 x 3 cm with associated rim enhancement and mild T2 hyperintense signal in the left cerebral hemisphere.   Presents today for evaluation, second opinion. She saw neurosurgery Dr. Rahman this morning at [facility] and was recommended surgical resection.  She denies ongoing headaches, dizziness, weakness, seizure like activities, other new/worsening focal neurological deficits. She endorses is very anxious given recent exam findings.  Remains on tamoxifen per onc Dr. Ureña @ [facility].   She is s/p pre-op SRS and L suboccipital craniotomy for tumor resection on 9/9/24. Path c/w metastatic carcinoma, consistent with mammary origin ER-, WI-, HER-2+  9/16/24: Presents today for routine post op phone call. Feeling well overall, denies fevers, chills, drainage, headaches. Washing hair daily. Continues to hold tamoxifen.   Saw Dr. Arellano on 9/23/24 with plan: Given locoregional treatment of brain metastasis (resection/SRS) and no evidence of disease in the body, recommendation is to continue endocrine therapy with tamoxifen (after completion of RT) and to restart HER2-directed systemic therapy with combination with trastuzumab and pertuzumab every 3 weeks to start on 10/7/24.   Returns TODAY for routine post op follow up. Reports headaches, takes tylenol with relief. Also reports 1 week of mild blurry vision. Has 1 month post SRS MRI scheduled for 10/1/24.   CARE TEAM:  *Onc: Jairo Ureña @ [facility]  [phone] [address] [place] [address]   *PCP: Marylou Carroll @ [facility]  [phone] [address] [place] [address]

## 2024-09-25 NOTE — REASON FOR VISIT
[de-identified] : L suboccipital craniotomy for tumor resection [de-identified] : 9/9/24 [de-identified] : 16 [de-identified] : PATH: Metastatic carcinoma, consistent with mammary origin ER-, [place]-, HER-2 +

## 2024-09-25 NOTE — REASON FOR VISIT
[de-identified] : L suboccipital craniotomy for tumor resection [de-identified] : 9/9/24 [de-identified] : 16 [de-identified] : PATH: Metastatic carcinoma, consistent with mammary origin ER-, [place]-, HER-2 +

## 2024-09-25 NOTE — HISTORY OF PRESENT ILLNESS
[de-identified] : 50 y/o right- handed female, former smoker, with a PMHx of inflammatory arthritis, GERD, iron deficiency anemia, dx with infiltrating ductal carcinoma of right breast Oct 2022 s/p biopsy 10/7/22- PATH: invasive and in situ ductal carcinoma (ER positive, NM positive, HER 2 3+ positive, Ki67 50%); s/p chemo Nov 2022- April 2023; s/p bilateral mastectomies 6/1/23 with bilateral breast reconstruction 9/5/23 who presents for evaluation of brain mass on recent MRI seen during workup for headache x 3 days that resolved on its own.   Onc HX: - 9/6/22 presented to ER with right breast pass that was painful/growing - 9/22/22 diagnostic mammo and US of right breast concerning for mass.  - 10/7/22 s/p right breast core needle biopsy. PATH: invasive and in situ ductal carcinoma; (ER positive, NM positive, HER 2 3+ positive, Ki67 50%) - 11/22/22- 1/24/23 completed neoadjuvant chemotherapy: first of Adriamycin 60 mg/m2 with cytoxan 600 mg/m2 (4 cycles) - 2/14/23- 4/18/23 s/p taxotere 75 mg/m2 with herceptin and perjeta - 6/1/23 s/p bilateral mastectomies - 7/12/23 restarted herceptin and perjeta with anastozole and lupron  - 9/5/23 s/p breast reconstruction  - 11/1/23: due to severe musculoskeletal pain and osteoporosis, anastazole was discontinued and tamoxifen started.  - 3/18/24 completed 1 year of herceptin and perjeta cycle #17  - She had headache x 3 days in about June that resolved on its own. Her PCP ordered her head CTH for eval that she completed 8/23/24 which was c/f interval development of cystic mass in left cerebellum compared to CT head 2/20/23. Was then referred for MRI brain which she completed 8/29/24 with report of a cystic mass in the left posterior fossa measuring 1.8 x 3 cm with associated rim enhancement and mild T2 hyperintense signal in the left cerebral hemisphere.   Presents today for evaluation, second opinion. She saw neurosurgery Dr. Rahman this morning at [facility] and was recommended surgical resection.  She denies ongoing headaches, dizziness, weakness, seizure like activities, other new/worsening focal neurological deficits. She endorses is very anxious given recent exam findings.  Remains on tamoxifen per onc Dr. Ureña @ [facility].   She is s/p pre-op SRS and L suboccipital craniotomy for tumor resection on 9/9/24. Path c/w metastatic carcinoma, consistent with mammary origin ER-, NM-, HER-2+  9/16/24: Presents today for routine post op phone call. Feeling well overall, denies fevers, chills, drainage, headaches. Washing hair daily. Continues to hold tamoxifen.   Saw Dr. Arellano on 9/23/24 with plan: Given locoregional treatment of brain metastasis (resection/SRS) and no evidence of disease in the body, recommendation is to continue endocrine therapy with tamoxifen (after completion of RT) and to restart HER2-directed systemic therapy with combination with trastuzumab and pertuzumab every 3 weeks to start on 10/7/24.   Returns TODAY for routine post op follow up. Reports headaches, takes tylenol with relief. Also reports 1 week of mild blurry vision. Has 1 month post SRS MRI scheduled for 10/1/24.   CARE TEAM:  *Onc: Jairo Ureña @ [facility]  [phone] [address] [place] [address]   *PCP: Marylou Carroll @ [facility]  [phone] [address] [place] [address]

## 2024-09-25 NOTE — REASON FOR VISIT
[de-identified] : L suboccipital craniotomy for tumor resection [de-identified] : 9/9/24 [de-identified] : 16 [de-identified] : PATH: Metastatic carcinoma, consistent with mammary origin ER-, [place]-, HER-2 +

## 2024-09-25 NOTE — PHYSICAL EXAM
[General Appearance - Alert] : alert [General Appearance - In No Acute Distress] : in no acute distress [Clean] : clean [Dry] : dry [Healing Well] : healing well [Intact] : intact [Staple Intact] : closed with intact staples [No Drainage] : without drainage [Normal Skin] : normal [Oriented To Time, Place, And Person] : oriented to person, place, and time [Balance] : balance was intact [Sclera] : the sclera and conjunctiva were normal [Outer Ear] : the ears and nose were normal in appearance [Neck Appearance] : the appearance of the neck was normal [] : no respiratory distress [Abnormal Walk] : normal gait [Skin Color & Pigmentation] : normal skin color and pigmentation [FreeTextEntry1] : suboccipital

## 2024-10-01 ENCOUNTER — APPOINTMENT (OUTPATIENT)
Dept: MRI IMAGING | Facility: HOSPITAL | Age: 49
End: 2024-10-01

## 2024-10-02 ENCOUNTER — APPOINTMENT (OUTPATIENT)
Dept: NEUROSURGERY | Facility: CLINIC | Age: 49
End: 2024-10-02
Payer: COMMERCIAL

## 2024-10-02 DIAGNOSIS — Z98.890 OTHER SPECIFIED POSTPROCEDURAL STATES: ICD-10-CM

## 2024-10-02 DIAGNOSIS — Z92.3 PERSONAL HISTORY OF IRRADIATION: ICD-10-CM

## 2024-10-02 DIAGNOSIS — C50.919 MALIGNANT NEOPLASM OF UNSPECIFIED SITE OF UNSPECIFIED FEMALE BREAST: ICD-10-CM

## 2024-10-02 DIAGNOSIS — C79.31 MALIGNANT NEOPLASM OF UNSPECIFIED SITE OF UNSPECIFIED FEMALE BREAST: ICD-10-CM

## 2024-10-02 DIAGNOSIS — G93.89 OTHER SPECIFIED DISORDERS OF BRAIN: ICD-10-CM

## 2024-10-02 PROCEDURE — 99442: CPT | Mod: 93

## 2024-10-02 NOTE — REASON FOR VISIT
[Follow-Up: _____] : a [unfilled] follow-up visit [Home] : at home, [unfilled] , at the time of the visit. [Medical Office: ([facility])___] : at the medical office located in  [Verbal consent obtained from patient] : the patient, [unfilled] [FreeTextEntry1] : S/P left suboccipital craniotomy for metastatic carcinoma of mammary origin (ER-, [place]-, HER-2 +). Follow- up and MRI review.

## 2024-10-02 NOTE — HISTORY OF PRESENT ILLNESS
[FreeTextEntry1] : 48 y/o right- handed female, former smoker, with a PMHx of inflammatory arthritis, GERD, iron deficiency anemia, dx with infiltrating ductal carcinoma of right breast Oct 2022 s/p biopsy 10/7/22- PATH: invasive and in situ ductal carcinoma (ER positive, SC positive, HER 2 3+ positive, Ki67 50%); s/p chemo Nov 2022- April 2023; s/p bilateral mastectomies 6/1/23 with bilateral breast reconstruction 9/5/23 who was found to have brain mass on recent MRI seen during workup for headache x 3 days. Now S/P Left suboccipital craniotomy for tumor resection 9/9/24 (PATH: Metastatic carcinoma, consistent with mammary origin ER-, SC-, HER-2 +).    Hx in brief: - 9/6/22 presented to ER with right breast pass that was painful/growing - 9/22/22 diagnostic mammo and US of right breast concerning for mass. - 10/7/22 s/p right breast core needle biopsy. PATH: invasive and in situ ductal carcinoma; (ER positive, SC positive, HER 2 3+ positive, Ki67 50%) - 11/22/22- 1/24/23 completed neoadjuvant chemotherapy: first of Adriamycin 60 mg/m2 with cytoxan 600 mg/m2 (4 cycles) - 2/14/23- 4/18/23 s/p taxotere 75 mg/m2 with herceptin and perjeta - 6/1/23 s/p bilateral mastectomies - 7/12/23 restarted herceptin and perjeta with anastozole and lupron - 9/5/23 s/p breast reconstruction - 11/1/23: due to severe musculoskeletal pain and osteoporosis, anastazole was discontinued and tamoxifen started. - 3/18/24 completed 1 year of herceptin and perjeta cycle #17  - She had headache x 3 days in about June that resolved on its own. Her PCP ordered her head CTH for eval that she completed 8/23/24 which was c/f interval development of cystic mass in left cerebellum compared to CT head 2/20/23. Was then referred for MRI brain which she completed 8/29/24 with report of a cystic mass in the left posterior fossa measuring 1.8 x 3 cm with associated rim enhancement and mild T2 hyperintense signal in the left cerebral hemisphere.  - 9/3/24 pt presented for evaluation. On tamoxifen per onc Dr. Ureña @ [facility]. Planned for surgical resection.   - 9/6/24 s/p pre- op SRS  - 9/9/24 s/p left suboccipital craniotomy for tumor resection. PATH: c/w metastatic carcinoma, consistent with mammary origin ER-, SC-, HER-2+  - 9/23/24 She saw onc Dr. Arellano with plan: Given locoregional treatment of brain metastasis (resection/SRS) and no evidence of disease in the body, recommendation is to continue endocrine therapy with tamoxifen (after completion of RT) and to restart HER2-directed systemic therapy with combination with trastuzumab and pertuzumab every 3 weeks to start on 10/7/24.  - 9/25/24 post op visit: noted to have 1 week of mild blurry vision. Has 1 month post SRS MRI scheduled for 10/1/24. Recommended f/u after MRI for review/ chemo per Dr. Arellano.   Returns TODAY for follow- up and review of MRI that she completed yesterday 10/1.  Notes intermittent headaches that she takes Advil for which has been helping her.  Pending radiation planning per Dr. Wernicke.  Denies signs of any postop wound infection which could include but not limited to redness, swelling, purulent drainage.     CARE TEAM: *Onc: Jairo Ureña @ [facility] [phone] [address] [place] [address]  *PCP: Marylou Carroll @ [facility] [phone] [address] [place] [address]

## 2024-10-02 NOTE — ASSESSMENT
[FreeTextEntry1] : 49 female with a history of breast cancer diagnosed in 2022. The initial tumor was ER positive, AZ positive, and HER2 positive (triple positive). She underwent chemotherapy and mastectomies. In June, she presented with headaches, and subsequent workup revealed brain metastases. She underwent preoperative SRS as part of a clinical trial and subsequent left suboccipital craniotomy and resection of tumor. Has established care with Dr. Arellano.  Interestingly, The brain metastases were ER negative, AZ negative, and HER2 positive, indicating a change in receptor status from the primary tumor. MRI with stable post-op changes and right cerebellar met. Will schedule for SRS and plan for follow up. Will also refer to Dr. Alfaro for pain control and supportive oncology follow up.   Dr. D'Amico independently reviewed all available images with patient.   PLAN: - RT planning per Dr. Wernicke. Will plan for MRI 2 months post RT - Continue f/u with onc Dr. Arellano - Referral to pain/palliative Dr. Alfaro  - Will continue to follow    Patient verbalizes understanding of today's discussion and next steps in treatment plan.   Today, my ACP, Kacey Pierson, was here to observe my evaluation and management services for this new problem/exacerbated condition to be followed going forward.    A total of 15 minutes was spent reviewing the labs, imaging and physical examination of the patient. We discussed the diagnosis, and the plan. The patient's questions were answered. The patient demonstrated an excellent understanding of the plan.

## 2024-10-02 NOTE — ASSESSMENT
[FreeTextEntry1] : 49 female with a history of breast cancer diagnosed in 2022. The initial tumor was ER positive, MA positive, and HER2 positive (triple positive). She underwent chemotherapy and mastectomies. In June, she presented with headaches, and subsequent workup revealed brain metastases. She underwent preoperative SRS as part of a clinical trial and subsequent left suboccipital craniotomy and resection of tumor. Has established care with Dr. Arellano.  Interestingly, The brain metastases were ER negative, MA negative, and HER2 positive, indicating a change in receptor status from the primary tumor. MRI with stable post-op changes and right cerebellar met. Will schedule for SRS and plan for follow up. Will also refer to Dr. Alfaro for pain control and supportive oncology follow up.   Dr. D'Amico independently reviewed all available images with patient.   PLAN: - RT planning per Dr. Wernicke. Will plan for MRI 2 months post RT - Continue f/u with onc Dr. Arellano - Referral to pain/palliative Dr. Alfaro  - Will continue to follow    Patient verbalizes understanding of today's discussion and next steps in treatment plan.   Today, my ACP, Kacey Pierson, was here to observe my evaluation and management services for this new problem/exacerbated condition to be followed going forward.    A total of 15 minutes was spent reviewing the labs, imaging and physical examination of the patient. We discussed the diagnosis, and the plan. The patient's questions were answered. The patient demonstrated an excellent understanding of the plan.

## 2024-10-02 NOTE — REASON FOR VISIT
[Follow-Up: _____] : a [unfilled] follow-up visit [Home] : at home, [unfilled] , at the time of the visit. [Medical Office: ([facility])___] : at the medical office located in  [Verbal consent obtained from patient] : the patient, [unfilled] [FreeTextEntry1] : S/P left suboccipital craniotomy for metastatic carcinoma of mammary origin (ER-, HI-, HER-2 +). Follow- up and MRI review.

## 2024-10-02 NOTE — HISTORY OF PRESENT ILLNESS
[FreeTextEntry1] : 50 y/o right- handed female, former smoker, with a PMHx of inflammatory arthritis, GERD, iron deficiency anemia, dx with infiltrating ductal carcinoma of right breast Oct 2022 s/p biopsy 10/7/22- PATH: invasive and in situ ductal carcinoma (ER positive, ME positive, HER 2 3+ positive, Ki67 50%); s/p chemo Nov 2022- April 2023; s/p bilateral mastectomies 6/1/23 with bilateral breast reconstruction 9/5/23 who was found to have brain mass on recent MRI seen during workup for headache x 3 days. Now S/P Left suboccipital craniotomy for tumor resection 9/9/24 (PATH: Metastatic carcinoma, consistent with mammary origin ER-, ME-, HER-2 +).    Hx in brief: - 9/6/22 presented to ER with right breast pass that was painful/growing - 9/22/22 diagnostic mammo and US of right breast concerning for mass. - 10/7/22 s/p right breast core needle biopsy. PATH: invasive and in situ ductal carcinoma; (ER positive, ME positive, HER 2 3+ positive, Ki67 50%) - 11/22/22- 1/24/23 completed neoadjuvant chemotherapy: first of Adriamycin 60 mg/m2 with cytoxan 600 mg/m2 (4 cycles) - 2/14/23- 4/18/23 s/p taxotere 75 mg/m2 with herceptin and perjeta - 6/1/23 s/p bilateral mastectomies - 7/12/23 restarted herceptin and perjeta with anastozole and lupron - 9/5/23 s/p breast reconstruction - 11/1/23: due to severe musculoskeletal pain and osteoporosis, anastazole was discontinued and tamoxifen started. - 3/18/24 completed 1 year of herceptin and perjeta cycle #17  - She had headache x 3 days in about June that resolved on its own. Her PCP ordered her head CTH for eval that she completed 8/23/24 which was c/f interval development of cystic mass in left cerebellum compared to CT head 2/20/23. Was then referred for MRI brain which she completed 8/29/24 with report of a cystic mass in the left posterior fossa measuring 1.8 x 3 cm with associated rim enhancement and mild T2 hyperintense signal in the left cerebral hemisphere.  - 9/3/24 pt presented for evaluation. On tamoxifen per onc Dr. Ureña @ [facility]. Planned for surgical resection.   - 9/6/24 s/p pre- op SRS  - 9/9/24 s/p left suboccipital craniotomy for tumor resection. PATH: c/w metastatic carcinoma, consistent with mammary origin ER-, ME-, HER-2+  - 9/23/24 She saw onc Dr. Arellano with plan: Given locoregional treatment of brain metastasis (resection/SRS) and no evidence of disease in the body, recommendation is to continue endocrine therapy with tamoxifen (after completion of RT) and to restart HER2-directed systemic therapy with combination with trastuzumab and pertuzumab every 3 weeks to start on 10/7/24.  - 9/25/24 post op visit: noted to have 1 week of mild blurry vision. Has 1 month post SRS MRI scheduled for 10/1/24. Recommended f/u after MRI for review/ chemo per Dr. Arellano.   Returns TODAY for follow- up and review of MRI that she completed yesterday 10/1.  Notes intermittent headaches that she takes Advil for which has been helping her.  Pending radiation planning per Dr. Wernicke.  Denies signs of any postop wound infection which could include but not limited to redness, swelling, purulent drainage.     CARE TEAM: *Onc: Jairo Ureña @ [facility] [phone] [address] [place] [address]  *PCP: Marylou Carroll @ [facility] [phone] [address] [place] [address]

## 2024-10-04 ENCOUNTER — NON-APPOINTMENT (OUTPATIENT)
Age: 49
End: 2024-10-04